# Patient Record
Sex: FEMALE | Race: WHITE
[De-identification: names, ages, dates, MRNs, and addresses within clinical notes are randomized per-mention and may not be internally consistent; named-entity substitution may affect disease eponyms.]

---

## 2018-01-14 ENCOUNTER — HOSPITAL ENCOUNTER (EMERGENCY)
Dept: HOSPITAL 96 - M.ERS | Age: 58
Discharge: HOME | End: 2018-01-14
Payer: MEDICAID

## 2018-01-14 VITALS — HEIGHT: 64 IN | BODY MASS INDEX: 41.48 KG/M2 | WEIGHT: 242.99 LBS

## 2018-01-14 VITALS — SYSTOLIC BLOOD PRESSURE: 113 MMHG | DIASTOLIC BLOOD PRESSURE: 66 MMHG

## 2018-01-14 DIAGNOSIS — R07.89: ICD-10-CM

## 2018-01-14 DIAGNOSIS — G89.29: ICD-10-CM

## 2018-01-14 DIAGNOSIS — E03.9: ICD-10-CM

## 2018-01-14 DIAGNOSIS — R11.0: Primary | ICD-10-CM

## 2018-01-14 DIAGNOSIS — J42: ICD-10-CM

## 2018-01-14 DIAGNOSIS — K21.9: ICD-10-CM

## 2018-01-14 DIAGNOSIS — I10: ICD-10-CM

## 2018-01-14 DIAGNOSIS — Z90.710: ICD-10-CM

## 2018-01-14 DIAGNOSIS — E78.00: ICD-10-CM

## 2018-01-14 DIAGNOSIS — Z88.2: ICD-10-CM

## 2018-01-14 DIAGNOSIS — M19.90: ICD-10-CM

## 2018-01-14 LAB
ABSOLUTE BASOPHILS: 0.1 THOU/UL (ref 0–0.2)
ABSOLUTE EOSINOPHILS: 0.5 THOU/UL (ref 0–0.7)
ABSOLUTE MONOCYTES: 0.4 THOU/UL (ref 0–1.2)
ALBUMIN SERPL-MCNC: 3.5 G/DL (ref 3.4–5)
ALP SERPL-CCNC: 96 U/L (ref 46–116)
ALT SERPL-CCNC: 29 U/L (ref 30–65)
ANION GAP SERPL CALC-SCNC: 6 MMOL/L (ref 7–16)
ANISOCYTOSIS BLD QL SMEAR: (no result)
AST SERPL-CCNC: 19 U/L (ref 15–37)
BARBITURATES UR-MCNC: POSITIVE UG/ML
BASOPHILS NFR BLD AUTO: 1 %
BILIRUB SERPL-MCNC: 0.2 MG/DL
BILIRUB UR-MCNC: NEGATIVE MG/DL
BUN SERPL-MCNC: 22 MG/DL (ref 7–18)
CALCIUM SERPL-MCNC: 9 MG/DL (ref 8.5–10.1)
CHLORIDE SERPL-SCNC: 106 MMOL/L (ref 98–107)
CO2 SERPL-SCNC: 29 MMOL/L (ref 21–32)
COLOR UR: YELLOW
CREAT SERPL-MCNC: 0.9 MG/DL (ref 0.6–1.3)
EOSINOPHIL NFR BLD: 6.8 %
GLUCOSE SERPL-MCNC: 157 MG/DL (ref 70–99)
GRANULOCYTES NFR BLD MANUAL: 58.2 %
HCT VFR BLD CALC: 39.2 % (ref 37–47)
HGB BLD-MCNC: 12.4 GM/DL (ref 12–15)
KETONES UR STRIP-MCNC: NEGATIVE MG/DL
LIPASE: 49 U/L (ref 73–393)
LYMPHOCYTES # BLD: 2.1 THOU/UL (ref 0.8–5.3)
LYMPHOCYTES NFR BLD AUTO: 28.7 %
MCH RBC QN AUTO: 26.4 PG (ref 26–34)
MCHC RBC AUTO-ENTMCNC: 31.6 G/DL (ref 28–37)
MCV RBC: 83.7 FL (ref 80–100)
MONOCYTES NFR BLD: 5.3 %
MPV: 7.7 FL. (ref 7.2–11.1)
NEUTROPHILS # BLD: 4.3 THOU/UL (ref 1.6–8.1)
NITRITE UR QL STRIP: NEGATIVE
NUCLEATED RBCS: 0 /100WBC
PLATELET # BLD EST: ADEQUATE 10*3/UL
PLATELET COUNT*: 270 THOU/UL (ref 150–400)
POTASSIUM SERPL-SCNC: 4.4 MMOL/L (ref 3.5–5.1)
PROT SERPL-MCNC: 6.7 G/DL (ref 6.4–8.2)
PROT UR QL STRIP: NEGATIVE
RBC # BLD AUTO: 4.69 MIL/UL (ref 4.2–5)
RBC # UR STRIP: NEGATIVE /UL
RBC #/AREA URNS HPF: (no result) /HPF (ref 0–2)
RDW-CV: 24.2 % (ref 10.5–14.5)
SODIUM SERPL-SCNC: 141 MMOL/L (ref 136–145)
SP GR UR STRIP: >= 1.03 (ref 1–1.03)
SQUAMOUS: (no result) /LPF (ref 0–3)
TROPONIN-I LEVEL: <0.06 NG/ML (ref ?–0.06)
URINE CLARITY: (no result)
URINE GLUCOSE-RANDOM: NEGATIVE
URINE LEUKOCYTES: (no result)
UROBILINOGEN UR STRIP-ACNC: 0.2 E.U./DL (ref 0.2–1)
WBC # BLD AUTO: 7.3 THOU/UL (ref 4–11)

## 2018-01-15 NOTE — EKG
[place] [address]
Phone:  [phone]                     ELECTROCARDIOGRAM REPORT      
_______________________________________________________________________________
 
Name:       TANI OBREGON                   Room:                      [facility]#:  [id number]      Account #:      [id number]  
Admission:  18     Attend Phys:                         
Discharge:  18     Date of Birth:  [birth year]  
         Report #: [id number]
    [id number]
_______________________________________________________________________________
THIS REPORT FOR:  //name//                      
 
                         [facility] ED
                                       
Test Date:    2018               Test Time:    12:53:34
Pat Name:     TANI OBREGON               Department:   
Patient ID:   [id number]            Room:          
Gender:       F                        Technician:   
:          [id number]               Requested By: Olga Lidia Venegas
Order Number: [id number] MD:   Fredy Alva
                                 Measurements
Intervals                              Axis          
Rate:         60                       P:            42
CT:           171                      QRS:          -14
QRSD:         100                      T:            30
QT:           465                                    
QTc:          465                                    
                           Interpretive Statements
Sinus rhythm
Low voltage, precordial leads
No previous ECG available for comparison
 
Electronically Signed On 1- 16:22:38 CST by Fredy Alva
[email or web address]
 
 
 
 
 
 
 
 
 
 
 
 
 
 
 
 
 
 
 
  <ELECTRONICALLY SIGNED>
                                           By: Fredy Alva MD, [facility]     
  01/15/18     1622
D: 18 1253   _____________________________________
T: 18 1253   Fredy Alva MD, FACC       /EPI